# Patient Record
Sex: MALE | Race: BLACK OR AFRICAN AMERICAN | NOT HISPANIC OR LATINO | URBAN - METROPOLITAN AREA
[De-identification: names, ages, dates, MRNs, and addresses within clinical notes are randomized per-mention and may not be internally consistent; named-entity substitution may affect disease eponyms.]

---

## 2024-05-08 PROBLEM — Z00.00 ENCOUNTER FOR PREVENTIVE HEALTH EXAMINATION: Status: ACTIVE | Noted: 2024-05-08

## 2024-05-09 ENCOUNTER — OUTPATIENT (OUTPATIENT)
Dept: OUTPATIENT SERVICES | Facility: HOSPITAL | Age: 69
LOS: 1 days | End: 2024-05-09
Payer: MEDICAID

## 2024-05-09 ENCOUNTER — APPOINTMENT (OUTPATIENT)
Dept: UROLOGY | Facility: CLINIC | Age: 69
End: 2024-05-09
Payer: MEDICAID

## 2024-05-09 ENCOUNTER — RESULT REVIEW (OUTPATIENT)
Age: 69
End: 2024-05-09

## 2024-05-09 DIAGNOSIS — Z80.9 FAMILY HISTORY OF MALIGNANT NEOPLASM, UNSPECIFIED: ICD-10-CM

## 2024-05-09 DIAGNOSIS — C61 MALIGNANT NEOPLASM OF PROSTATE: ICD-10-CM

## 2024-05-09 DIAGNOSIS — Z00.8 ENCOUNTER FOR OTHER GENERAL EXAMINATION: ICD-10-CM

## 2024-05-09 DIAGNOSIS — E10.641 TYPE 1 DIABETES MELLITUS WITH HYPOGLYCEMIA WITH COMA: ICD-10-CM

## 2024-05-09 DIAGNOSIS — E78.00 PURE HYPERCHOLESTEROLEMIA, UNSPECIFIED: ICD-10-CM

## 2024-05-09 DIAGNOSIS — Z78.9 OTHER SPECIFIED HEALTH STATUS: ICD-10-CM

## 2024-05-09 PROCEDURE — 76770 US EXAM ABDO BACK WALL COMP: CPT

## 2024-05-09 PROCEDURE — 99204 OFFICE O/P NEW MOD 45 MIN: CPT

## 2024-05-09 PROCEDURE — 76770 US EXAM ABDO BACK WALL COMP: CPT | Mod: 26

## 2024-05-09 RX ORDER — TELMISARTAN AND HYDROCHLOROTHIAZIDE 80; 12.5 MG/1; MG/1
80-12.5 TABLET ORAL
Refills: 0 | Status: ACTIVE | COMMUNITY

## 2024-05-09 RX ORDER — METFORMIN HYDROCHLORIDE 1000 MG/1
1000 TABLET, COATED ORAL
Refills: 0 | Status: ACTIVE | COMMUNITY

## 2024-05-09 NOTE — LETTER BODY
[Dear  ___] : Dear  [unfilled], [Consult Letter:] : I had the pleasure of evaluating your patient, [unfilled]. [Please see my note below.] : Please see my note below. [Sincerely,] : Sincerely, [FreeTextEntry3] : .sig

## 2024-05-09 NOTE — ASSESSMENT
[FreeTextEntry1] : 68 y/o gentleman with a prior history of prostate cancer diagnosed 2 years prior in [place] for a PSA of 6 - as per the patient and without high risk features the lesion in question as described by the patient had a small focus of cancer he denies having had any issues with the biopsy he also recalls having an MRI in [place] - and he states that on the imaging cancer was identified discussed in detail the relevance of imaging and Shelli score in determining aggressiveness of cancer   the patient does not express bothersome symptoms related to the  tract  Labs 5/2/2024 PSA 9.79 ng/ml  HgbA1c 6.8 %  no imaging provided  Plan 68 yo with prostate cancer (no pathology provided - PSA reviewed - requested the records - MRI of the prostate - Renal and bladder US - f/u to review and schedule a biopsy

## 2024-05-09 NOTE — HISTORY OF PRESENT ILLNESS
[FreeTextEntry1] : 68 y/o gentleman with a prior history of prostate cancer diagnosed 2 years prior in [place] for a PSA of 6 - as per the patient and without high risk features the lesion in question as described by the patient had a small focus of cancer he denies having had any issues with the biopsy he also recalls having an MRI in [place] - and he states that on the imaging cancer was identified discussed in detail the relevance of imaging and Shelli score in determining aggressiveness of cancer   the patient does not express bothersome symptoms related to the  tract  Labs 5/2/2024 PSA 9.79 ng/ml  HgbA1c 6.8 %  no imaging provided [Urinary Retention] : no urinary retention [Urinary Urgency] : no urinary urgency [Urinary Frequency] : no urinary frequency [Straining] : no straining [Weak Stream] : no weak stream

## 2024-05-10 DIAGNOSIS — C61 MALIGNANT NEOPLASM OF PROSTATE: ICD-10-CM

## 2024-05-23 ENCOUNTER — OUTPATIENT (OUTPATIENT)
Dept: OUTPATIENT SERVICES | Facility: HOSPITAL | Age: 69
LOS: 1 days | End: 2024-05-23
Payer: MEDICAID

## 2024-05-23 ENCOUNTER — RESULT REVIEW (OUTPATIENT)
Age: 69
End: 2024-05-23

## 2024-05-23 DIAGNOSIS — C61 MALIGNANT NEOPLASM OF PROSTATE: ICD-10-CM

## 2024-05-23 PROCEDURE — 72197 MRI PELVIS W/O & W/DYE: CPT

## 2024-05-23 PROCEDURE — 72197 MRI PELVIS W/O & W/DYE: CPT | Mod: 26

## 2024-05-23 PROCEDURE — A9579: CPT

## 2024-05-24 DIAGNOSIS — C61 MALIGNANT NEOPLASM OF PROSTATE: ICD-10-CM

## 2024-05-28 ENCOUNTER — NON-APPOINTMENT (OUTPATIENT)
Age: 69
End: 2024-05-28

## 2024-06-13 ENCOUNTER — APPOINTMENT (OUTPATIENT)
Dept: UROLOGY | Facility: CLINIC | Age: 69
End: 2024-06-13

## 2024-07-18 ENCOUNTER — OUTPATIENT (OUTPATIENT)
Dept: OUTPATIENT SERVICES | Facility: HOSPITAL | Age: 69
LOS: 1 days | End: 2024-07-18
Payer: MEDICARE

## 2024-07-18 ENCOUNTER — RESULT REVIEW (OUTPATIENT)
Age: 69
End: 2024-07-18

## 2024-07-18 DIAGNOSIS — N42.89 OTHER SPECIFIED DISORDERS OF PROSTATE: ICD-10-CM

## 2024-07-18 PROCEDURE — 76377 3D RENDER W/INTRP POSTPROCES: CPT | Mod: 26

## 2024-07-18 PROCEDURE — 76377 3D RENDER W/INTRP POSTPROCES: CPT

## 2024-07-19 DIAGNOSIS — N42.89 OTHER SPECIFIED DISORDERS OF PROSTATE: ICD-10-CM

## 2024-07-22 ENCOUNTER — APPOINTMENT (OUTPATIENT)
Dept: GASTROENTEROLOGY | Facility: CLINIC | Age: 69
End: 2024-07-22
Payer: MEDICAID

## 2024-07-22 VITALS — HEIGHT: 66 IN | BODY MASS INDEX: 34.39 KG/M2 | WEIGHT: 214 LBS

## 2024-07-22 DIAGNOSIS — Z86.79 PERSONAL HISTORY OF OTHER DISEASES OF THE CIRCULATORY SYSTEM: ICD-10-CM

## 2024-07-22 DIAGNOSIS — Z78.9 OTHER SPECIFIED HEALTH STATUS: ICD-10-CM

## 2024-07-22 DIAGNOSIS — E10.641 TYPE 1 DIABETES MELLITUS WITH HYPOGLYCEMIA WITH COMA: ICD-10-CM

## 2024-07-22 DIAGNOSIS — Z86.39 PERSONAL HISTORY OF OTHER ENDOCRINE, NUTRITIONAL AND METABOLIC DISEASE: ICD-10-CM

## 2024-07-22 DIAGNOSIS — Z12.11 ENCOUNTER FOR SCREENING FOR MALIGNANT NEOPLASM OF COLON: ICD-10-CM

## 2024-07-22 DIAGNOSIS — C61 MALIGNANT NEOPLASM OF PROSTATE: ICD-10-CM

## 2024-07-22 DIAGNOSIS — K59.00 CONSTIPATION, UNSPECIFIED: ICD-10-CM

## 2024-07-22 DIAGNOSIS — Z80.9 FAMILY HISTORY OF MALIGNANT NEOPLASM, UNSPECIFIED: ICD-10-CM

## 2024-07-22 PROCEDURE — 99204 OFFICE O/P NEW MOD 45 MIN: CPT

## 2024-07-22 RX ORDER — ASPIRIN ENTERIC COATED TABLETS 81 MG 81 MG/1
81 TABLET, DELAYED RELEASE ORAL
Refills: 0 | Status: ACTIVE | COMMUNITY

## 2024-07-22 RX ORDER — AMLODIPINE BESYLATE 10 MG/1
10 TABLET ORAL
Refills: 0 | Status: ACTIVE | COMMUNITY

## 2024-07-22 NOTE — PHYSICAL EXAM
[Alert] : alert [Normal Voice/Communication] : normal voice/communication [No Acute Distress] : no acute distress [Well Developed] : well developed [Obese (BMI >= 30)] : obese (BMI >= 30) [Sclera] : the sclera and conjunctiva were normal [Hearing Threshold Finger Rub Not [place]] : hearing was normal [Normal Lips/Gums] : the lips and gums were normal [Oropharynx] : the oropharynx was normal [Normal Appearance] : the appearance of the neck was normal [No Respiratory Distress] : no respiratory distress [No Acc Muscle Use] : no accessory muscle use [Respiration, Rhythm And Depth] : normal respiratory rhythm and effort [Auscultation Breath Sounds / Voice Sounds] : lungs were clear to auscultation bilaterally [Heart Rate And Rhythm] : heart rate was normal and rhythm regular [Normal S1, S2] : normal S1 and S2 [Bowel Sounds] : normal bowel sounds [Abdomen Tenderness] : non-tender [No Masses] : no abdominal mass palpated [Abdomen Soft] : soft [Abnormal Walk] : normal gait [Normal Color / Pigmentation] : normal skin color and pigmentation [Oriented To Time, Place, And Person] : oriented to person, place, and time

## 2024-07-22 NOTE — PHYSICAL EXAM
[Alert] : alert [Normal Voice/Communication] : normal voice/communication [No Acute Distress] : no acute distress [Well Developed] : well developed [Obese (BMI >= 30)] : obese (BMI >= 30) [Sclera] : the sclera and conjunctiva were normal [Hearing Threshold Finger Rub Not [place]] : hearing was normal [Normal Lips/Gums] : the lips and gums were normal [Oropharynx] : the oropharynx was normal [Normal Appearance] : the appearance of the neck was normal [No Respiratory Distress] : no respiratory distress [No Acc Muscle Use] : no accessory muscle use [Respiration, Rhythm And Depth] : normal respiratory rhythm and effort [Auscultation Breath Sounds / Voice Sounds] : lungs were clear to auscultation bilaterally [Heart Rate And Rhythm] : heart rate was normal and rhythm regular [Normal S1, S2] : normal S1 and S2 [Bowel Sounds] : normal bowel sounds [Abdomen Tenderness] : non-tender [No Masses] : no abdominal mass palpated [Abdomen Soft] : soft [Abnormal Walk] : normal gait [Normal Color / Pigmentation] : normal skin color and pigmentation [Oriented To Time, Place, And Person] : oriented to person, place, and time

## 2024-07-22 NOTE — PHYSICAL EXAM
[Alert] : alert [Normal Voice/Communication] : normal voice/communication [No Acute Distress] : no acute distress [Well Developed] : well developed [Obese (BMI >= 30)] : obese (BMI >= 30) [Sclera] : the sclera and conjunctiva were normal [Hearing Threshold Finger Rub Not [place]] : hearing was normal [Normal Lips/Gums] : the lips and gums were normal [Oropharynx] : the oropharynx was normal [Normal Appearance] : the appearance of the neck was normal [No Respiratory Distress] : no respiratory distress [No Acc Muscle Use] : no accessory muscle use [Respiration, Rhythm And Depth] : normal respiratory rhythm and effort [Auscultation Breath Sounds / Voice Sounds] : lungs were clear to auscultation bilaterally [Heart Rate And Rhythm] : heart rate was normal and rhythm regular [Normal S1, S2] : normal S1 and S2 [Bowel Sounds] : normal bowel sounds [Abdomen Tenderness] : non-tender [No Masses] : no abdominal mass palpated [Abdomen Soft] : soft [Abnormal Walk] : normal gait [Normal Color / Pigmentation] : normal skin color and pigmentation [Oriented To Time, Place, And Person] : oriented to person, place, and time

## 2024-07-24 NOTE — HISTORY OF PRESENT ILLNESS
[FreeTextEntry1] : 69 yr old male with DM, HTN, HLD, at average risk for CRC,   who had a prostate biopsy in [place] about 2 yrs ago which was suspicious for prostate cancer. He had an MRI of the prostate done 6/6/24 which showed 12x 8 mm lesion in the left lateral mid gland; he is scheduled for a prostate biopsy on 8/5/24  for confirmation of the cancer. He was therefore referred here by Dr. Biggs for a colonoscopy. He had a colonoscopy done about 15 yrs ago; he was told to repeat in 10 yrs. He denied a family H/O colon, gastric, or other GI cancers. He C/O constipation for the past 2 months. He has not taken anything other than water and he has a BM about every other day with hard stools. He denied blood in the stool, weight loss, heartburn, dysphagia, vomiting, diarrhea, or weight loss.

## 2024-07-24 NOTE — ASSESSMENT
[FreeTextEntry1] : 69 yr old male with DM, HTN, HLD, at average risk for CRC,   who had a prostate biopsy in [place] about 2 yrs ago which was suspicious for prostate cancer. He had an MRI of the prostate done 6/6/24 which showed 12x 8 mm lesion in the left lateral mid gland; he is scheduled for a prostate biopsy on 8/5/24  for confirmation of the cancer. He was therefore referred here by Dr. Biggs for a colonoscopy. He had a colonoscopy done about 15 yrs ago; he was told to repeat in 10 yrs. He denied a family H/O colon, gastric, or other GI cancers. He C/O constipation for the past 2 months. He has not taken anything other than water and he has a BM about every other day with hard stools. He denied blood in the stool, weight loss, heartburn, dysphagia, vomiting, diarrhea, or weight loss.   Prostate cancer, awaiting biopsy for confirmation - Will schedule a colonoscopy ASAP so that appropriate treatment planning can be done; risks and benefits discussed - F/U after the procedure is done  Constipation - Pt told to take colace 100 mg 2 tabs a day and miralax OD to bid 2 weeks before the colonoscopy and then prn

## 2024-07-27 PROBLEM — I10 ESSENTIAL (PRIMARY) HYPERTENSION: Chronic | Status: ACTIVE | Noted: 2024-07-23

## 2024-07-27 PROBLEM — E11.9 TYPE 2 DIABETES MELLITUS WITHOUT COMPLICATIONS: Chronic | Status: ACTIVE | Noted: 2024-07-23

## 2024-07-27 PROBLEM — Z87.898 PERSONAL HISTORY OF OTHER SPECIFIED CONDITIONS: Chronic | Status: ACTIVE | Noted: 2024-07-23

## 2024-08-01 ENCOUNTER — NON-APPOINTMENT (OUTPATIENT)
Age: 69
End: 2024-08-01

## 2024-08-01 ENCOUNTER — APPOINTMENT (OUTPATIENT)
Dept: CARDIOLOGY | Facility: CLINIC | Age: 69
End: 2024-08-01
Payer: MEDICAID

## 2024-08-01 ENCOUNTER — RESULT REVIEW (OUTPATIENT)
Age: 69
End: 2024-08-01

## 2024-08-01 VITALS
WEIGHT: 207 LBS | BODY MASS INDEX: 33.27 KG/M2 | SYSTOLIC BLOOD PRESSURE: 154 MMHG | DIASTOLIC BLOOD PRESSURE: 88 MMHG | HEART RATE: 56 BPM | HEIGHT: 66 IN

## 2024-08-01 DIAGNOSIS — R07.89 OTHER CHEST PAIN: ICD-10-CM

## 2024-08-01 DIAGNOSIS — E78.00 PURE HYPERCHOLESTEROLEMIA, UNSPECIFIED: ICD-10-CM

## 2024-08-01 PROCEDURE — 99204 OFFICE O/P NEW MOD 45 MIN: CPT

## 2024-08-01 PROCEDURE — 93000 ELECTROCARDIOGRAM COMPLETE: CPT

## 2024-08-01 RX ORDER — ALBUTEROL SULFATE 90 UG/1
108 (90 BASE) INHALANT RESPIRATORY (INHALATION)
Refills: 0 | Status: DISCONTINUED | COMMUNITY
End: 2024-08-01

## 2024-08-01 RX ORDER — SODIUM SULFATE, POTASSIUM SULFATE AND MAGNESIUM SULFATE 1.6; 3.13; 17.5 G/177ML; G/177ML; G/177ML
17.5-3.13-1.6 SOLUTION ORAL
Qty: 2 | Refills: 0 | Status: DISCONTINUED | COMMUNITY
Start: 2024-07-22 | End: 2024-08-01

## 2024-08-01 RX ORDER — DOCUSATE SODIUM 100 MG/1
100 CAPSULE ORAL TWICE DAILY
Qty: 60 | Refills: 6 | Status: DISCONTINUED | COMMUNITY
Start: 2024-07-22 | End: 2024-08-01

## 2024-08-01 RX ORDER — POLYETHYLENE GLYCOL 3350 17 G/17G
17 POWDER, FOR SOLUTION ORAL DAILY
Qty: 510 | Refills: 6 | Status: DISCONTINUED | COMMUNITY
Start: 2024-07-22 | End: 2024-08-01

## 2024-08-01 NOTE — ASSESSMENT
[FreeTextEntry1] : Mr. Barreto is a 69-year-old man with history of DM2, HLD, and HTN presenting for evaluation of chest discomfort.  Impression: (1) Atypical chest pain, ECG with inferior infarct (2) DM2, A1c 6.8% on metformin (3) Dyslipidemia, , not on statin therapy (4) Hypertension, on telmisartan-HCTZ 80-12.5mg and amlodipine 10mg  Plan: - Not a candidate for exercise stress testing due to orthopedic issues. Will refer for NM stress Lexiscan - Check baseline TTE for RWMAs to correlate with IWMI - Start rosuvastatin 20mg - Continue aspirin - Continue CCB as an antianginal; bradycardia limits use of BB - If BP remains elevated on follow up can start spironolactone 25mg daily and initiate secondary hypertension workup.  RTC 1 month, sooner as needed

## 2024-08-01 NOTE — HISTORY OF PRESENT ILLNESS
[FreeTextEntry1] : Mr. Barreto is a 69-year-old man with history of DM2, HLD, and HTN presenting for evaluation of chest discomfort.  Patient reports generally being in good health. Within the past year, he has noticed an intermittent non-exertional chest discomfort, prompting him to see his PCP, who referred him for cardiac consultation. No symptoms with stairs or with walking on an incline. No dyspnea, palpitations, PND, or orthopnea.  His brother  in his 60s, likely secondary to heart disease.  ECG shows NSR, inferior infarct, no acute ischemic changes  Labs: - TSH 3.08, A1c 6.8% - Cr 1.3, K 4.9, normal transaminases - , HDL 63, TG 78, , non- - WBC 3.2, Hgb 12.0, Plt 258  Noted Meds: - ASA 81mg - Metformin 1000mg - Telmisartan-HCTZ 80-12.5mg - Amlodipine 10mg

## 2024-08-05 ENCOUNTER — APPOINTMENT (OUTPATIENT)
Dept: UROLOGY | Facility: HOSPITAL | Age: 69
End: 2024-08-05

## 2024-08-05 ENCOUNTER — OUTPATIENT (OUTPATIENT)
Dept: OUTPATIENT SERVICES | Facility: HOSPITAL | Age: 69
LOS: 1 days | Discharge: HOME | End: 2024-08-05
Payer: MEDICAID

## 2024-08-05 ENCOUNTER — TRANSCRIPTION ENCOUNTER (OUTPATIENT)
Age: 69
End: 2024-08-05

## 2024-08-05 ENCOUNTER — NON-APPOINTMENT (OUTPATIENT)
Age: 69
End: 2024-08-05

## 2024-08-05 ENCOUNTER — RESULT REVIEW (OUTPATIENT)
Age: 69
End: 2024-08-05

## 2024-08-05 VITALS
HEART RATE: 79 BPM | SYSTOLIC BLOOD PRESSURE: 168 MMHG | WEIGHT: 214.07 LBS | HEIGHT: 66 IN | OXYGEN SATURATION: 99 % | TEMPERATURE: 98 F | RESPIRATION RATE: 18 BRPM | DIASTOLIC BLOOD PRESSURE: 84 MMHG

## 2024-08-05 VITALS — DIASTOLIC BLOOD PRESSURE: 67 MMHG | HEART RATE: 60 BPM | RESPIRATION RATE: 18 BRPM | SYSTOLIC BLOOD PRESSURE: 140 MMHG

## 2024-08-05 DIAGNOSIS — C61 MALIGNANT NEOPLASM OF PROSTATE: ICD-10-CM

## 2024-08-05 DIAGNOSIS — I10 ESSENTIAL (PRIMARY) HYPERTENSION: ICD-10-CM

## 2024-08-05 DIAGNOSIS — R97.20 ELEVATED PROSTATE SPECIFIC ANTIGEN [PSA]: ICD-10-CM

## 2024-08-05 DIAGNOSIS — Z79.84 LONG TERM (CURRENT) USE OF ORAL HYPOGLYCEMIC DRUGS: ICD-10-CM

## 2024-08-05 DIAGNOSIS — E11.9 TYPE 2 DIABETES MELLITUS WITHOUT COMPLICATIONS: ICD-10-CM

## 2024-08-05 LAB — GLUCOSE BLDC GLUCOMTR-MCNC: 176 MG/DL — HIGH (ref 70–99)

## 2024-08-05 PROCEDURE — 88344 IMHCHEM/IMCYTCHM EA MLT ANTB: CPT

## 2024-08-05 PROCEDURE — C9399: CPT

## 2024-08-05 PROCEDURE — 82962 GLUCOSE BLOOD TEST: CPT

## 2024-08-05 PROCEDURE — 55700: CPT

## 2024-08-05 PROCEDURE — 93975 VASCULAR STUDY: CPT | Mod: 26

## 2024-08-05 PROCEDURE — 76999F: CUSTOM | Mod: 26

## 2024-08-05 PROCEDURE — 76872 US TRANSRECTAL: CPT | Mod: 26

## 2024-08-05 PROCEDURE — 88342 IMHCHEM/IMCYTCHM 1ST ANTB: CPT | Mod: 26

## 2024-08-05 PROCEDURE — G0416: CPT

## 2024-08-05 PROCEDURE — G0416: CPT | Mod: 26

## 2024-08-05 RX ORDER — DEXTROSE MONOHYDRATE, SODIUM CHLORIDE, SODIUM LACTATE, CALCIUM CHLORIDE, MAGNESIUM CHLORIDE 1.5; 538; 448; 18.4; 5.08 G/100ML; MG/100ML; MG/100ML; MG/100ML; MG/100ML
500 SOLUTION INTRAPERITONEAL
Refills: 0 | Status: DISCONTINUED | OUTPATIENT
Start: 2024-08-05 | End: 2024-08-05

## 2024-08-05 RX ORDER — ACETAMINOPHEN 500 MG
975 TABLET ORAL ONCE
Refills: 0 | Status: DISCONTINUED | OUTPATIENT
Start: 2024-08-05 | End: 2024-08-05

## 2024-08-05 NOTE — BRIEF OPERATIVE NOTE - NSICDXBRIEFPROCEDURE_GEN_ALL_CORE_FT
PROCEDURES:  Biopsy, prostate, with integrated MRI-transrectal US guidance 05-Aug-2024 10:24:44  Parag LAWSON

## 2024-08-05 NOTE — BRIEF OPERATIVE NOTE - NSICDXBRIEFPREOP_GEN_ALL_CORE_FT
PRE-OP DIAGNOSIS:  Elevated PSA 05-Aug-2024 10:25:04  Parag LAWSON  Prostate carcinoma 05-Aug-2024 10:26:32  Parag LAWSON

## 2024-08-05 NOTE — PRE-ANESTHESIA EVALUATION ADULT - NSANTHOSAYNRD_GEN_A_CORE
denies/No. TAWANDA screening performed.  STOP BANG Legend: 0-2 = LOW Risk; 3-4 = INTERMEDIATE Risk; 5-8 = HIGH Risk

## 2024-08-05 NOTE — ASU DISCHARGE PLAN (ADULT/PEDIATRIC) - ASU DC SPECIAL INSTRUCTIONSFT
expect some blood in urine /semen x couple weeks   expect some pain   resume any other meds   call office now for appt w/ Dr. Can in 2 weeks   Tylenol for pain

## 2024-08-05 NOTE — ASU DISCHARGE PLAN (ADULT/PEDIATRIC) - NS MD DC FALL RISK RISK
For information on Fall & Injury Prevention, visit: [email or web address] OR  [email or web address] OR  https://www.cdc.gov/steadi/patient.html

## 2024-08-05 NOTE — ASU PATIENT PROFILE, ADULT - NSICDXPASTMEDICALHX_GEN_ALL_CORE_FT
PAST MEDICAL HISTORY:  DM (diabetes mellitus)     History of elevated prostate specific antigen (PSA)     HTN (hypertension)

## 2024-08-05 NOTE — BRIEF OPERATIVE NOTE - OPERATION/FINDINGS
AMANDA #1- left lateral ,midgland, peripheral zone   NABIL #2. - left anterior -lateral midgland , peripheral zone

## 2024-08-05 NOTE — BRIEF OPERATIVE NOTE - NSICDXBRIEFPOSTOP_GEN_ALL_CORE_FT
POST-OP DIAGNOSIS:  Elevated PSA 05-Aug-2024 10:25:16  Parag LAWSON  Prostate carcinoma 05-Aug-2024 10:26:43  Parag LAWSON

## 2024-08-06 ENCOUNTER — RESULT REVIEW (OUTPATIENT)
Age: 69
End: 2024-08-06

## 2024-08-06 ENCOUNTER — NON-APPOINTMENT (OUTPATIENT)
Age: 69
End: 2024-08-06

## 2024-08-06 ENCOUNTER — TRANSCRIPTION ENCOUNTER (OUTPATIENT)
Age: 69
End: 2024-08-06

## 2024-08-06 ENCOUNTER — OUTPATIENT (OUTPATIENT)
Dept: OUTPATIENT SERVICES | Facility: HOSPITAL | Age: 69
LOS: 1 days | End: 2024-08-06
Payer: MEDICAID

## 2024-08-06 DIAGNOSIS — R07.89 OTHER CHEST PAIN: ICD-10-CM

## 2024-08-06 PROCEDURE — 93018 CV STRESS TEST I&R ONLY: CPT

## 2024-08-06 PROCEDURE — A9500: CPT

## 2024-08-06 PROCEDURE — 78452 HT MUSCLE IMAGE SPECT MULT: CPT | Mod: 26

## 2024-08-06 PROCEDURE — 78452 HT MUSCLE IMAGE SPECT MULT: CPT

## 2024-08-07 ENCOUNTER — NON-APPOINTMENT (OUTPATIENT)
Age: 69
End: 2024-08-07

## 2024-08-07 DIAGNOSIS — R07.89 OTHER CHEST PAIN: ICD-10-CM

## 2024-08-11 LAB — SURGICAL PATHOLOGY STUDY: SIGNIFICANT CHANGE UP

## 2024-08-12 ENCOUNTER — APPOINTMENT (OUTPATIENT)
Dept: CARDIOLOGY | Facility: CLINIC | Age: 69
End: 2024-08-12
Payer: MEDICAID

## 2024-08-12 PROCEDURE — 93306 TTE W/DOPPLER COMPLETE: CPT

## 2024-08-19 ENCOUNTER — APPOINTMENT (OUTPATIENT)
Dept: GASTROENTEROLOGY | Facility: CLINIC | Age: 69
End: 2024-08-19

## 2024-08-22 ENCOUNTER — APPOINTMENT (OUTPATIENT)
Dept: UROLOGY | Facility: CLINIC | Age: 69
End: 2024-08-22

## 2024-08-22 VITALS
SYSTOLIC BLOOD PRESSURE: 151 MMHG | WEIGHT: 214 LBS | HEART RATE: 78 BPM | DIASTOLIC BLOOD PRESSURE: 87 MMHG | BODY MASS INDEX: 34.39 KG/M2 | HEIGHT: 66 IN | OXYGEN SATURATION: 98 %

## 2024-08-22 DIAGNOSIS — C61 MALIGNANT NEOPLASM OF PROSTATE: ICD-10-CM

## 2024-08-22 LAB
BILIRUB UR QL STRIP: NEGATIVE
COLLECTION METHOD: NORMAL
GLUCOSE UR-MCNC: NEGATIVE
HCG UR QL: 0.2 EU/DL
HGB UR QL STRIP.AUTO: NORMAL
KETONES UR-MCNC: NEGATIVE
LEUKOCYTE ESTERASE UR QL STRIP: NEGATIVE
NITRITE UR QL STRIP: NEGATIVE
PH UR STRIP: 6
PROT UR STRIP-MCNC: NEGATIVE
SP GR UR STRIP: 1.02

## 2024-08-22 PROCEDURE — 81003 URINALYSIS AUTO W/O SCOPE: CPT | Mod: QW

## 2024-08-22 PROCEDURE — 99214 OFFICE O/P EST MOD 30 MIN: CPT

## 2024-08-26 NOTE — ASSESSMENT
[FreeTextEntry1] : 68 y/o gentleman with a prior history of prostate cancer diagnosed 2 years prior in [place] for a PSA of 6 - as per the patient and without high risk features the lesion in question as described by the patient had a small focus of cancer he denies having had any issues with the biopsy he also recalls having an MRI in [place] - and he states that on the imaging cancer was identified discussed in detail the relevance of imaging and Shelli score in determining aggressiveness of cancer   the patient does not express bothersome symptoms related to the  tract  Labs 5/2/2024 PSA 9.79 ng/ml  HgbA1c 6.8 %  MRI prostate  5/2024IMPRESSION: 1.  No definite MR evidence of extraprostatic disease or lymphadenopathy. 2.  A 12 x 8 mm lesion in left lateral mid gland peripheral zone, may represent site of biopsy-proven neoplasm. PI-RADS Assessment Category: 4, High Prostate Volume: 72 mL PSA density: 0.14 ng/mL/mL  s/p prostate biopsy - perineal 8/2024   Prostate, left lateral base, needle biopsy - Adenocarcinoma of the prostate, Prognostic Grade Group 1 ([place] score 3+3=6) involving 40% (4 mm in length) of 1 of 1 core  Prostate, left lateral, needle biopsy - Adenocarcinoma of the prostate, Prognostic Grade Group 1 (Shelli score 3+3=6) involving 50% (5 mm in length) of 1 of 1 core  Prostate, region of interest left lateral mid gland peripheral zone, needle biopsy - Adenocarcinoma of the prostate, Prognostic Grade Group 1 (Shelli score 3+3=6) involving 80% (9 mm in length) of 1 of 1 core  Prostate, region of interest left anterior lateral mid gland peripheral zone, needle biopsy - Adenocarcinoma of the prostate, Prognostic Grade Group 1 ([place] score 3+3=6) involving 50% (4 mm in length) of 1 of 1 core  Plan 69 wo with multifocal [place] 3+3 prostate cancer discussed the biopsy findings explained the relevance of multifocality Our discussion summarized --  The natural history of this disease was explained to the patient at length and the treatment options discussed including radical prostatectomy by open or robotic-assisted laparoscopic approach, external-beam radiotherapy, brachytherapy, and watchful waiting, active surveillance, including the probability of success and complications associated with these approaches.  With respect to AS/watchful waiting, we discussed the difficulties of estimating the extent of disease preoperatively, the risk of cancer progression, and risk that salvage might not be possible with progression. However, the favorable long-term outcomes of active surveillance in appropriately selected patients was conveyed.  With respect to seed implants, we discussed risks including but not limited to cancer recurrence, exacerbation of voiding symptoms or hematuria, urinary retention, induction of 2nd malignancy, and risks of rectal symptoms or bleeding. We also discussed risks of the anesthesia including but not limited to MI, CVA, DVT, and PE. With respect to EBRT, we discussed we discussed risks including but not limited to cancer recurrence, exacerbation of voiding symptoms or hematuria, urinary retention, incontinence, stricture of the urinary tract, induction of 2nd malignancy, and risks of rectal symptoms or bleeding. We discussed fact that rectal symptoms may be more common with EBRT than with other treatment modalities. I did convey that the risk of erectile dysfunction was likely lower with EBRT, at least in the short-term.   With radiation therapy, we discussed the difficulties in diagnosing recurrent disease at an early stage due to variability in PSA levels and the fact that most patients are not candidates for local salvage therapy when biochemical recurrence is declared. We also discussed the significant morbidity of local salvage therapy in terms of perioperative complications, erectile dysfunction and urinary incontinence.   With respect to radical prostatectomy, the pros and cons of open vs robotic-assisted laparoscopic prostatectomy were discussed. The complications of this procedure were reviewed with the patient and include (but not limited to) urinary incontinence, erectile dysfunction, infertility, anastomotic stricture, lymphocele, hemorrhage requiring transfusion, rectal, ureteral, or nerve injury, infection, cardiovascular, pulmonary, thromboembolic, and anesthetic complications. For robotic prostatectomy, the additional complications of anastomotic urine leak and small bowel obstruction from adhesions was conveyed. We also discussed the need for a urethral catheter as well as a YUVAL drain post-operatively.  With surgery, we also discussed the potential advantage over radiation therapy in that biochemical recurrence can be detected at a relatively earlier stage and that salvage radiotherapy is successful in controlling recurrent disease in a substantial proportion of patients. I also conveyed that salvage radiotherapy was associated with a considerably more favorable morbidity profile compared to local salvage therapies for radiorecurrent disease.  We also discussed prostate cryotherapy in detail, including aspects related to the procedure and the outcomes with respect to cancer control, urinary dysfunction, impotence, and morbidity.  All of the patient questions were answered. will refer to radiation oncology decipher of the biopsy specimen f/u to discuss his final decision

## 2024-08-26 NOTE — HISTORY OF PRESENT ILLNESS
[FreeTextEntry1] : 70 y/o gentleman with a prior history of prostate cancer diagnosed 2 years prior in [place] for a PSA of 6 - as per the patient and without high risk features the lesion in question as described by the patient had a small focus of cancer he denies having had any issues with the biopsy he also recalls having an MRI in [place] - and he states that on the imaging cancer was identified discussed in detail the relevance of imaging and Shelli score in determining aggressiveness of cancer   the patient does not express bothersome symptoms related to the  tract  Labs 5/2/2024 PSA 9.79 ng/ml  HgbA1c 6.8 %  MRI prostate  5/2024IMPRESSION: 1.  No definite MR evidence of extraprostatic disease or lymphadenopathy. 2.  A 12 x 8 mm lesion in left lateral mid gland peripheral zone, may represent site of biopsy-proven neoplasm. PI-RADS Assessment Category: 4, High Prostate Volume: 72 mL PSA density: 0.14 ng/mL/mL  s/p prostate biopsy - perineal 8/2024   Prostate, left lateral base, needle biopsy - Adenocarcinoma of the prostate, Prognostic Grade Group 1 (Shelli score 3+3=6) involving 40% (4 mm in length) of 1 of 1 core  Prostate, left lateral, needle biopsy - Adenocarcinoma of the prostate, Prognostic Grade Group 1 (Shelli score 3+3=6) involving 50% (5 mm in length) of 1 of 1 core  Prostate, region of interest left lateral mid gland peripheral zone, needle biopsy - Adenocarcinoma of the prostate, Prognostic Grade Group 1 (Shelli score 3+3=6) involving 80% (9 mm in length) of 1 of 1 core  Prostate, region of interest left anterior lateral mid gland peripheral zone, needle biopsy - Adenocarcinoma of the prostate, Prognostic Grade Group 1 ([place] score 3+3=6) involving 50% (4 mm in length) of 1 of 1 core - See comment [Urinary Retention] : no urinary retention [Urinary Urgency] : no urinary urgency [Urinary Frequency] : no urinary frequency [Straining] : no straining [Weak Stream] : no weak stream

## 2024-08-26 NOTE — LETTER BODY
[Dear  ___] : Dear  [unfilled], [Consult Letter:] : I had the pleasure of evaluating your patient, [unfilled]. [Please see my note below.] : Please see my note below. [Sincerely,] : Sincerely, [FreeTextEntry3] : Shayy Can MD, FACS

## 2024-08-29 ENCOUNTER — APPOINTMENT (OUTPATIENT)
Dept: VASCULAR SURGERY | Facility: CLINIC | Age: 69
End: 2024-08-29
Payer: MEDICAID

## 2024-08-29 VITALS
HEIGHT: 66 IN | DIASTOLIC BLOOD PRESSURE: 77 MMHG | BODY MASS INDEX: 34.39 KG/M2 | WEIGHT: 214 LBS | SYSTOLIC BLOOD PRESSURE: 144 MMHG

## 2024-08-29 DIAGNOSIS — I70.213 ATHEROSCLEROSIS OF NATIVE ARTERIES OF EXTREMITIES WITH INTERMITTENT CLAUDICATION, BILATERAL LEGS: ICD-10-CM

## 2024-08-29 PROCEDURE — 93925 LOWER EXTREMITY STUDY: CPT

## 2024-08-29 PROCEDURE — 99203 OFFICE O/P NEW LOW 30 MIN: CPT

## 2024-08-29 NOTE — DATA REVIEWED
[FreeTextEntry1] : Arterial duplex of the lower extremity shows no evidence of hemodynamically significant stenosis present.
Stable

## 2024-08-29 NOTE — HISTORY OF PRESENT ILLNESS
[FreeTextEntry1] : The patient is a 69-year-old male who presents with left leg claudication symptoms at a half block of ambulation.

## 2024-08-29 NOTE — PHYSICAL EXAM
[Ankle Swelling (On Exam)] : present [Ankle Swelling Bilaterally] : bilaterally  [Ankle Swelling On The Right] : mild [2+] : right 2+ [Varicose Veins Of Lower Extremities] : not present [] : not present

## 2024-08-29 NOTE — REASON FOR VISIT
[Consultation] : a consultation visit [FreeTextEntry1] : For claudication symptoms left leg worse than right leg

## 2024-08-29 NOTE — ASSESSMENT
[FreeTextEntry1] : The patient is a 69-year-old male who presents complaining of left leg claudication.  On physical exam he has left leg femoral popliteal DP and PT pulses present.  The patient had an arterial duplex that shows no evidence of significant arterial insufficiency present as well.  The patient has an MRI of his lumbar sacral spine that shows severe L3-L4 disc herniation L4-L5 disc protrusion.  I recommend the patient follow-up with neurology for evaluation of his lumbar spine he may benefit from intervention or physical therapy.   No vascular surgery intervention warranted at this time follow-up as needed.  I, Dr. Kamar Hall, personally performed the evaluation and management (E/M) services for this new patient. That E/M includes conducting the clinically appropriate initial history &/or exam, assessing all conditions, and establishing the plan of care. Today, my NANI, Pilar Levin PA-C, was here to observe my evaluation and management service for this patient & follow plan of care established by me going forward.

## 2024-09-05 ENCOUNTER — APPOINTMENT (OUTPATIENT)
Dept: CARDIOLOGY | Facility: CLINIC | Age: 69
End: 2024-09-05
Payer: MEDICAID

## 2024-09-05 VITALS
DIASTOLIC BLOOD PRESSURE: 70 MMHG | HEART RATE: 76 BPM | SYSTOLIC BLOOD PRESSURE: 110 MMHG | BODY MASS INDEX: 34.39 KG/M2 | WEIGHT: 214 LBS | HEIGHT: 66 IN

## 2024-09-05 DIAGNOSIS — R94.39 ABNORMAL RESULT OF OTHER CARDIOVASCULAR FUNCTION STUDY: ICD-10-CM

## 2024-09-05 DIAGNOSIS — E78.00 PURE HYPERCHOLESTEROLEMIA, UNSPECIFIED: ICD-10-CM

## 2024-09-05 DIAGNOSIS — R07.89 OTHER CHEST PAIN: ICD-10-CM

## 2024-09-05 PROCEDURE — 93000 ELECTROCARDIOGRAM COMPLETE: CPT

## 2024-09-05 PROCEDURE — 99214 OFFICE O/P EST MOD 30 MIN: CPT

## 2024-09-05 NOTE — ASSESSMENT
[FreeTextEntry1] : Mr. Barreto is a 69-year-old man with history of DM2, HLD, and HTN presenting for evaluation of chest discomfort.  Impression: (1) Atypical chest pain, ECG with inferior infarct, NM stress with inferior partially reversible defect,  (2) DM2, A1c 6.8% on metformin (3) Dyslipidemia, , not on statin therapy (4) Hypertension, on telmisartan-HCTZ 80-12.5mg and amlodipine 10mg  Plan: - LHC to define coronary anatomy and for possible PCI if there is a significant lesion in the RCA. - Continue rosuvastatin 20mg - Continue aspirin - Continue CCB as an antianginal; bradycardia limits use of BB - Hypotension limits use of an ARNI. - Recommend adding an SGLT2i after LHC.  RTC after LHC

## 2024-09-05 NOTE — HISTORY OF PRESENT ILLNESS
[FreeTextEntry1] : Mr. Barreto is a 69-year-old man with history of DM2, HLD, and HTN presenting for evaluation of chest discomfort.  Patient reports generally being in good health. Within the past year, he has noticed an intermittent non-exertional chest discomfort, prompting him to see his PCP, who referred him for cardiac consultation. No symptoms with stairs or with walking on an incline. No dyspnea, palpitations, PND, or orthopnea.  His brother  in his 60s, likely secondary to heart disease. ECG shows NSR, inferior infarct, no acute ischemic changes NM stress demonstrated a partially reversible defect of the inferior wall. TTE showed an EF of 48% without overt RWMAs; otherwise with no significant valvular disease and a mildly dilated ascending aorta of 4.0cm.  He was started on aspirin, statin, and amlodipine. He is not on a BB due to bradycardia. He does endorse occasional exertional dyspnea and occasional chest discomfort, though not major complaints to him.  Labs: - TSH 3.08, A1c 6.8% - Cr 1.3, K 4.9, normal transaminases - , HDL 63, TG 78, , non- - WBC 3.2, Hgb 12.0, Plt 258  Noted Meds: - ASA 81mg - Rosuvastatin 20mg - Metformin 1000mg - Telmisartan-HCTZ 80-12.5mg - Amlodipine 10mg

## 2024-09-06 LAB
ALBUMIN SERPL ELPH-MCNC: 4.6 G/DL
ALP BLD-CCNC: 66 U/L
ALT SERPL-CCNC: 12 U/L
ANION GAP SERPL CALC-SCNC: 14 MMOL/L
APTT BLD: 32.1 SEC
AST SERPL-CCNC: 15 U/L
BILIRUB SERPL-MCNC: 0.4 MG/DL
BUN SERPL-MCNC: 18 MG/DL
CALCIUM SERPL-MCNC: 10.2 MG/DL
CHLORIDE SERPL-SCNC: 104 MMOL/L
CHOLEST SERPL-MCNC: 152 MG/DL
CO2 SERPL-SCNC: 25 MMOL/L
CREAT SERPL-MCNC: 1.3 MG/DL
EGFR: 59 ML/MIN/1.73M2
ESTIMATED AVERAGE GLUCOSE: 160 MG/DL
GLUCOSE SERPL-MCNC: 163 MG/DL
HBA1C MFR BLD HPLC: 7.2 %
HDLC SERPL-MCNC: 52 MG/DL
INR PPP: 0.92 RATIO
LDLC SERPL CALC-MCNC: 88 MG/DL
NONHDLC SERPL-MCNC: 100 MG/DL
POTASSIUM SERPL-SCNC: 4.7 MMOL/L
PROT SERPL-MCNC: 7.8 G/DL
PT BLD: 10.5 SEC
SODIUM SERPL-SCNC: 143 MMOL/L
TRIGL SERPL-MCNC: 58 MG/DL

## 2024-09-09 LAB — APO LP(A) SERPL-MCNC: 146.6 NMOL/L

## 2024-09-11 ENCOUNTER — OUTPATIENT (OUTPATIENT)
Dept: OUTPATIENT SERVICES | Facility: HOSPITAL | Age: 69
LOS: 1 days | End: 2024-09-11
Payer: MEDICAID

## 2024-09-11 ENCOUNTER — APPOINTMENT (OUTPATIENT)
Dept: RADIATION ONCOLOGY | Facility: HOSPITAL | Age: 69
End: 2024-09-11
Payer: MEDICAID

## 2024-09-11 VITALS
DIASTOLIC BLOOD PRESSURE: 84 MMHG | BODY MASS INDEX: 34.61 KG/M2 | WEIGHT: 215.38 LBS | HEIGHT: 66 IN | HEART RATE: 83 BPM | SYSTOLIC BLOOD PRESSURE: 154 MMHG | RESPIRATION RATE: 16 BRPM | OXYGEN SATURATION: 99 % | TEMPERATURE: 97.8 F

## 2024-09-11 DIAGNOSIS — C61 MALIGNANT NEOPLASM OF PROSTATE: ICD-10-CM

## 2024-09-11 DIAGNOSIS — N40.0 BENIGN PROSTATIC HYPERPLASIA WITHOUT LOWER URINARY TRACT SYMPMS: ICD-10-CM

## 2024-09-11 PROCEDURE — 99204 OFFICE O/P NEW MOD 45 MIN: CPT

## 2024-09-11 RX ORDER — MAGNESIUM OXIDE/MAG AA CHELATE 300 MG
CAPSULE ORAL
Refills: 0 | Status: ACTIVE | COMMUNITY

## 2024-09-11 RX ORDER — GINGER ROOT/GINGER ROOT EXT 262.5 MG
CAPSULE ORAL
Refills: 0 | Status: ACTIVE | COMMUNITY

## 2024-09-11 RX ORDER — ZINC SULFATE 50(220)MG
CAPSULE ORAL
Refills: 0 | Status: ACTIVE | COMMUNITY

## 2024-09-11 RX ORDER — FINASTERIDE 5 MG/1
5 TABLET, FILM COATED ORAL DAILY
Qty: 90 | Refills: 1 | Status: ACTIVE | COMMUNITY
Start: 2024-09-11 | End: 1900-01-01

## 2024-09-11 NOTE — VITALS
[Date: ____________] : Patient's last distress assessment performed on [unfilled]. [4 - Distress Level] : Distress Level: 4 [Referred Patient  to social work for follow-up] : Patient was referred to social work for follow-up [Patient given social work contact information and resource sheet] : Patient was given social work contact information and resource sheet [Maximal Pain Intensity: 0/10] : 0/10 [90: Able to carry normal activity; minor signs or symptoms of disease.] : 90: Able to carry normal activity; minor signs or symptoms of disease.

## 2024-09-12 ENCOUNTER — NON-APPOINTMENT (OUTPATIENT)
Age: 69
End: 2024-09-12

## 2024-09-16 DIAGNOSIS — C61 MALIGNANT NEOPLASM OF PROSTATE: ICD-10-CM

## 2024-09-17 ENCOUNTER — APPOINTMENT (OUTPATIENT)
Dept: NEUROSURGERY | Facility: CLINIC | Age: 69
End: 2024-09-17

## 2024-09-17 NOTE — HISTORY OF PRESENT ILLNESS
[FreeTextEntry1] : 68 y/o gentleman with DM2, HLD, and HTN and a prior history of prostate cancer diagnosed 2 years prior in [place] for a PSA of 6 - as per the patient and without high risk features the lesion in question as described by the patient had a small focus of cancer. He also recalls having an MRI in [place] - and he states that on the imaging cancer was identified. He had PSA repeated on 5/2/2024 and it was 9.79ng/ml. His MRI was repeated on 6/6/24 and it showed 72cc prostate with a PIRADS 4 lesion at left lateral peripheral zone and PIRADS 2 lesion on left anterolateral mid gland peripheral zone. Prostate biopsy was done 8/5/24 and it showed 4/13 cores with [place] 6 Pca.   5/2/2024 PSA 9.79 ng/ml  MRI 6/6/24  72cc prostate LESION: #1Location: Left, lateral (PZl), midgland, peripheral zone. Extra-prostatic extension: None. Prior Comparison: None. PI-RADS Assessment Category: 4, High  LESION: #2 18 x 6 mm T2 intermediate signal intensity area in the left anterolateral mid gland peripheral zone without associated restricted diffusion or focal hyperenhancement (series 3 image 18). PI-RADS Assessment Category: 2, Low  Neurovascular bundle: No evidence of neurovascular bundle invasion. Seminal vesicles: No seminal vesicle invasion. Lymph nodes: No pelvic adenopathy. Bones: No suspicious lesions identified. Urinary bladder: Within normal limits. Other: Small bilateral fat-containing inguinal hernias.   8/5/24 biopsy 1. Prostate, right medial apex, needle biopsy- Benign prostatic tissue 2. Prostate, right medial base, needle biopsy- Benign prostatic tissue 3. Prostate, right lateral apex, needle biopsy- Benign prostatic tissue 4. Prostate, right lateral base, needle biopsy- Benign prostatic tissue 5. Prostate, right lateral, needle biopsy- Benign prostatic tissue 6. Prostate, right anterior, needle biopsy- Benign prostatic tissue 7. Prostate, left medial apex, needle biopsy- Benign prostatic tissue with chronic inflammation 8. Prostate, left medial base, needle biopsy- Focus of atypical glands 9. Prostate, left lateral apex, needle biopsy- Benign prostatic tissue 10. Prostate, left lateral base, needle biopsy- Adenocarcinoma of the prostate, Prognostic Grade Group 1 ([place] score 3+3=6) involving 40% (4 mm in length) of 1 of 1 core 11. Prostate, left lateral, needle biopsy- Adenocarcinoma of the prostate, Prognostic Grade Group 1 ([place] score 3+3=6) involving 50% (5 mm in length) of 1 of 1 core 12. Prostate, left anterior, needle biopsy- Focus of atypical glands 13. Prostate, region of interest left lateral mid gland peripheral zone,needle biopsy- Adenocarcinoma of the prostate, Prognostic Grade Group 1 (Shelli score 3+3=6) involving 80% (9 mm in length) of 1 of 1 core 14. Prostate, region of interest left anterior lateral mid gland peripheral zone, needle biopsy- Adenocarcinoma of the prostate, Prognostic Grade Group 1 ([place] score 3+3=6) involving 50% (4 mm in length) of 1 of 1 core  Had colonoscopy on 8/1/24 pathology- 1. Cecal polyps x 2, biopsy:- Sessile serrated lesion, negative for dysplasia. 2. Ascending colon polyp, biopsy:- Tubular adenoma 3. Transverse colon polyp, biopsy:- Tubular adenoma  Decipher pending

## 2024-09-17 NOTE — REVIEW OF SYSTEMS
[Fatigue] : fatigue [SOB on Exertion] : shortness of breath during exertion [Constipation] : constipation [Nocturia] : nocturia [Urinary Frequency] : urinary frequency [IPSS Score (0-40): ___] : IPSS score: [unfilled] [EPIC-CP Score (0-60): ___] : EPIC-CP score: [unfilled] [Joint Pain] : joint pain [Negative] : Allergic/Immunologic [Abdominal Pain] : no abdominal pain [Vomiting] : no vomiting [Diarrhea] : no diarrhea [Urinary Ostomy] : no ~T urinary ostomy present [FreeTextEntry3] : glasses for reading [FreeTextEntry7] : colonoscopy was 8/2024 [FreeTextEntry8] : depends on what he drinks/ up to 2 times nocturia [FreeTextEntry9] : arthritis

## 2024-09-17 NOTE — HISTORY OF PRESENT ILLNESS
[FreeTextEntry1] : 70 y/o gentleman with DM2, HLD, and HTN and a prior history of prostate cancer diagnosed 2 years prior in [place] for a PSA of 6 - as per the patient and without high risk features the lesion in question as described by the patient had a small focus of cancer. He also recalls having an MRI in [place] - and he states that on the imaging cancer was identified. He had PSA repeated on 5/2/2024 and it was 9.79ng/ml. His MRI was repeated on 6/6/24 and it showed 72cc prostate with a PIRADS 4 lesion at left lateral peripheral zone and PIRADS 2 lesion on left anterolateral mid gland peripheral zone. Prostate biopsy was done 8/5/24 and it showed 4/13 cores with [place] 6 Pca.   5/2/2024 PSA 9.79 ng/ml  MRI 6/6/24  72cc prostate LESION: #1Location: Left, lateral (PZl), midgland, peripheral zone. Extra-prostatic extension: None. Prior Comparison: None. PI-RADS Assessment Category: 4, High  LESION: #2 18 x 6 mm T2 intermediate signal intensity area in the left anterolateral mid gland peripheral zone without associated restricted diffusion or focal hyperenhancement (series 3 image 18). PI-RADS Assessment Category: 2, Low  Neurovascular bundle: No evidence of neurovascular bundle invasion. Seminal vesicles: No seminal vesicle invasion. Lymph nodes: No pelvic adenopathy. Bones: No suspicious lesions identified. Urinary bladder: Within normal limits. Other: Small bilateral fat-containing inguinal hernias.   8/5/24 biopsy 1. Prostate, right medial apex, needle biopsy- Benign prostatic tissue 2. Prostate, right medial base, needle biopsy- Benign prostatic tissue 3. Prostate, right lateral apex, needle biopsy- Benign prostatic tissue 4. Prostate, right lateral base, needle biopsy- Benign prostatic tissue 5. Prostate, right lateral, needle biopsy- Benign prostatic tissue 6. Prostate, right anterior, needle biopsy- Benign prostatic tissue 7. Prostate, left medial apex, needle biopsy- Benign prostatic tissue with chronic inflammation 8. Prostate, left medial base, needle biopsy- Focus of atypical glands 9. Prostate, left lateral apex, needle biopsy- Benign prostatic tissue 10. Prostate, left lateral base, needle biopsy- Adenocarcinoma of the prostate, Prognostic Grade Group 1 ([place] score 3+3=6) involving 40% (4 mm in length) of 1 of 1 core 11. Prostate, left lateral, needle biopsy- Adenocarcinoma of the prostate, Prognostic Grade Group 1 ([place] score 3+3=6) involving 50% (5 mm in length) of 1 of 1 core 12. Prostate, left anterior, needle biopsy- Focus of atypical glands 13. Prostate, region of interest left lateral mid gland peripheral zone,needle biopsy- Adenocarcinoma of the prostate, Prognostic Grade Group 1 (Shelli score 3+3=6) involving 80% (9 mm in length) of 1 of 1 core 14. Prostate, region of interest left anterior lateral mid gland peripheral zone, needle biopsy- Adenocarcinoma of the prostate, Prognostic Grade Group 1 ([place] score 3+3=6) involving 50% (4 mm in length) of 1 of 1 core  Had colonoscopy on 8/1/24 pathology- 1. Cecal polyps x 2, biopsy:- Sessile serrated lesion, negative for dysplasia. 2. Ascending colon polyp, biopsy:- Tubular adenoma 3. Transverse colon polyp, biopsy:- Tubular adenoma  Decipher pending

## 2024-09-17 NOTE — DISEASE MANAGEMENT
[1] : T1 [0] : M0 [0-10] : 0 -10 ng/mL [Biopsy with Fusion] : Patient had a biopsy with fusion on [6] : Fusion Biopsy Shelli Score: 6 [4] : 4 [I] : I [] : Patient had no Bone Scan performed [BiopsyDate] : 08/24 [MeasuredProstateVolume] : 72 [TotalCores] : 14 [TotalPositiveCores] : 4 [MaxCoreInvolvement] : 80

## 2024-09-17 NOTE — HISTORY OF PRESENT ILLNESS
[FreeTextEntry1] : 68 y/o gentleman with DM2, HLD, and HTN and a prior history of prostate cancer diagnosed 2 years prior in [place] for a PSA of 6 - as per the patient and without high risk features the lesion in question as described by the patient had a small focus of cancer. He also recalls having an MRI in [place] - and he states that on the imaging cancer was identified. He had PSA repeated on 5/2/2024 and it was 9.79ng/ml. His MRI was repeated on 6/6/24 and it showed 72cc prostate with a PIRADS 4 lesion at left lateral peripheral zone and PIRADS 2 lesion on left anterolateral mid gland peripheral zone. Prostate biopsy was done 8/5/24 and it showed 4/13 cores with [place] 6 Pca.   5/2/2024 PSA 9.79 ng/ml  MRI 6/6/24  72cc prostate LESION: #1Location: Left, lateral (PZl), midgland, peripheral zone. Extra-prostatic extension: None. Prior Comparison: None. PI-RADS Assessment Category: 4, High  LESION: #2 18 x 6 mm T2 intermediate signal intensity area in the left anterolateral mid gland peripheral zone without associated restricted diffusion or focal hyperenhancement (series 3 image 18). PI-RADS Assessment Category: 2, Low  Neurovascular bundle: No evidence of neurovascular bundle invasion. Seminal vesicles: No seminal vesicle invasion. Lymph nodes: No pelvic adenopathy. Bones: No suspicious lesions identified. Urinary bladder: Within normal limits. Other: Small bilateral fat-containing inguinal hernias.   8/5/24 biopsy 1. Prostate, right medial apex, needle biopsy- Benign prostatic tissue 2. Prostate, right medial base, needle biopsy- Benign prostatic tissue 3. Prostate, right lateral apex, needle biopsy- Benign prostatic tissue 4. Prostate, right lateral base, needle biopsy- Benign prostatic tissue 5. Prostate, right lateral, needle biopsy- Benign prostatic tissue 6. Prostate, right anterior, needle biopsy- Benign prostatic tissue 7. Prostate, left medial apex, needle biopsy- Benign prostatic tissue with chronic inflammation 8. Prostate, left medial base, needle biopsy- Focus of atypical glands 9. Prostate, left lateral apex, needle biopsy- Benign prostatic tissue 10. Prostate, left lateral base, needle biopsy- Adenocarcinoma of the prostate, Prognostic Grade Group 1 ([place] score 3+3=6) involving 40% (4 mm in length) of 1 of 1 core 11. Prostate, left lateral, needle biopsy- Adenocarcinoma of the prostate, Prognostic Grade Group 1 ([place] score 3+3=6) involving 50% (5 mm in length) of 1 of 1 core 12. Prostate, left anterior, needle biopsy- Focus of atypical glands 13. Prostate, region of interest left lateral mid gland peripheral zone,needle biopsy- Adenocarcinoma of the prostate, Prognostic Grade Group 1 (Shelli score 3+3=6) involving 80% (9 mm in length) of 1 of 1 core 14. Prostate, region of interest left anterior lateral mid gland peripheral zone, needle biopsy- Adenocarcinoma of the prostate, Prognostic Grade Group 1 ([place] score 3+3=6) involving 50% (4 mm in length) of 1 of 1 core  Had colonoscopy on 8/1/24 pathology- 1. Cecal polyps x 2, biopsy:- Sessile serrated lesion, negative for dysplasia. 2. Ascending colon polyp, biopsy:- Tubular adenoma 3. Transverse colon polyp, biopsy:- Tubular adenoma  Decipher pending

## 2024-09-17 NOTE — END OF VISIT
[Time Spent: ___ minutes] : I have spent [unfilled] minutes of time on the encounter which excludes teaching and separately reported services. [FreeTextEntry3] : MD Note: I personally performed the evaluation and management services for this patient. This includes conducting the examination, assessing all conditions, and establishing the plan of care. Today, my ACP, Arlene Roberto, was here to observe my evaluation and management services for this patient. I agree with the documentation above, which I have reviewed and edited where appropriate.

## 2024-09-18 ENCOUNTER — NON-APPOINTMENT (OUTPATIENT)
Age: 69
End: 2024-09-18

## 2024-09-19 ENCOUNTER — APPOINTMENT (OUTPATIENT)
Dept: NEUROSURGERY | Facility: CLINIC | Age: 69
End: 2024-09-19
Payer: MEDICAID

## 2024-09-19 VITALS — WEIGHT: 214 LBS | HEIGHT: 66 IN | BODY MASS INDEX: 34.39 KG/M2

## 2024-09-19 DIAGNOSIS — Z82.49 FAMILY HISTORY OF ISCHEMIC HEART DISEASE AND OTHER DISEASES OF THE CIRCULATORY SYSTEM: ICD-10-CM

## 2024-09-19 DIAGNOSIS — M54.16 RADICULOPATHY, LUMBAR REGION: ICD-10-CM

## 2024-09-19 DIAGNOSIS — Z85.9 PERSONAL HISTORY OF MALIGNANT NEOPLASM, UNSPECIFIED: ICD-10-CM

## 2024-09-19 PROBLEM — E78.5 HYPERLIPIDEMIA, UNSPECIFIED: Chronic | Status: ACTIVE | Noted: 2024-09-11

## 2024-09-19 PROCEDURE — 99202 OFFICE O/P NEW SF 15 MIN: CPT

## 2024-09-19 NOTE — HISTORY OF PRESENT ILLNESS
[de-identified] : Mr. BELL is a pleasant 69-year-old RH male presenting to the office today for an initial neurosurgical consultation for low back pain.   Patient endorses for the last year or so he has been experiencing low back pain. He states that he believed it began during the COVID-19 pandemic when he was stagnant most of the time and not ambulation as often or exercising. He describes a sharp, shooting sensation in a posterior fashion extending to his thighs, LLE worse than RLE. He does not take any oral medications for such. He actively participated in PT back in [place] which he states "made it much better." He performs home exercises daily. The majority of his pain is after ambulating which he is able to do without any assistive devices.  IMAGING:  [facility] 12/11/2023 MRI L spine: disc extrusion at L3-4 more on the right and L4-5 causing lateral recess stenosis   Mr. BELL states that he is traveling next week for two months to [place] and [place]. He is going to complete new MRI L spine and begin outpatient PT upon returning from his trip. He was given additional home exercises to take on his trips with him. He was grateful for the visit today and denied any questions at this time.   PHYSICAL EXAM: Constitutional: Well appearing, no distress HEENT: Normocephalic Atraumatic Psychiatric: Alert and oriented to all spheres, normal mood Pulmonary: No respiratory distress  Neurologic: CN II-XII grossly intact Palpation: No pain to palpation Strength: Full strength in all major muscle groups, no atrophy Sensation: Full sensation to light touch in all extremities Reflexes: 2+ patellar 2+ biceps 2+ ankle jerk  No Patel's No Clonus  ROM intact throughout all planes  SLR negative b/l Gait: Steady, walking w/o assistance.

## 2024-09-19 NOTE — ASSESSMENT
[FreeTextEntry1] : 68yo M with LLE radiculopathy. Physical therapy has alleviated many of his symptoms in the past. MRI L spine from 2023 reviewed identifying herniations at L3-4 and L4-5. Patient is going to complete a new MRI L spine and start outpatient PT again after returning from his trip in two months and then will contact the office for a follow up, understanding that he can contact us at any time in the interim.   I thank  ARABELLA for allowing me to be a part of his care team.   Leta Tatum MS, FNP-BC Nurse Practitioner [facility]

## 2024-09-19 NOTE — END OF VISIT
[Time Spent: ___ minutes] : I have spent [unfilled] minutes of time on the encounter which excludes teaching and separately reported services.
[Time Spent: ___ minutes] : I have spent [unfilled] minutes of time on the encounter which excludes teaching and separately reported services.
No

## 2024-09-19 NOTE — HISTORY OF PRESENT ILLNESS
[de-identified] : Mr. BELL is a pleasant 69-year-old RH male presenting to the office today for an initial neurosurgical consultation for low back pain.   Patient endorses for the last year or so he has been experiencing low back pain. He states that he believed it began during the COVID-19 pandemic when he was stagnant most of the time and not ambulation as often or exercising. He describes a sharp, shooting sensation in a posterior fashion extending to his thighs, LLE worse than RLE. He does not take any oral medications for such. He actively participated in PT back in [place] which he states "made it much better." He performs home exercises daily. The majority of his pain is after ambulating which he is able to do without any assistive devices.  IMAGING:  [facility] Radiology 12/11/2023 MRI L spine: disc extrusion at L3-4 more on the right and L4-5 causing lateral recess stenosis   Mr. BELL states that he is traveling next week for two months to [place] and [place]. He is going to complete new MRI L spine and begin outpatient PT upon returning from his trip. He was given additional home exercises to take on his trips with him. He was grateful for the visit today and denied any questions at this time.   PHYSICAL EXAM: Constitutional: Well appearing, no distress HEENT: Normocephalic Atraumatic Psychiatric: Alert and oriented to all spheres, normal mood Pulmonary: No respiratory distress  Neurologic: CN II-XII grossly intact Palpation: No pain to palpation Strength: Full strength in all major muscle groups, no atrophy Sensation: Full sensation to light touch in all extremities Reflexes: 2+ patellar 2+ biceps 2+ ankle jerk  No Patel's No Clonus  ROM intact throughout all planes  SLR negative b/l Gait: Steady, walking w/o assistance.

## 2024-09-19 NOTE — ASSESSMENT
[FreeTextEntry1] : 70yo M with LLE radiculopathy. Physical therapy has alleviated many of his symptoms in the past. MRI L spine from 2023 reviewed identifying herniations at L3-4 and L4-5. Patient is going to complete a new MRI L spine and start outpatient PT again after returning from his trip in two months and then will contact the office for a follow up, understanding that he can contact us at any time in the interim.   I thank  ARABELLA for allowing me to be a part of his care team.   Leta Tatum MS, FNP-BC Nurse Practitioner [facility]

## 2024-10-03 ENCOUNTER — APPOINTMENT (OUTPATIENT)
Dept: UROLOGY | Facility: CLINIC | Age: 69
End: 2024-10-03

## 2024-11-22 ENCOUNTER — TRANSCRIPTION ENCOUNTER (OUTPATIENT)
Age: 69
End: 2024-11-22

## 2024-12-06 ENCOUNTER — APPOINTMENT (OUTPATIENT)
Dept: CARDIOLOGY | Facility: CLINIC | Age: 69
End: 2024-12-06